# Patient Record
Sex: FEMALE | Race: WHITE | NOT HISPANIC OR LATINO
[De-identification: names, ages, dates, MRNs, and addresses within clinical notes are randomized per-mention and may not be internally consistent; named-entity substitution may affect disease eponyms.]

---

## 2019-11-18 ENCOUNTER — RESULT REVIEW (OUTPATIENT)
Age: 49
End: 2019-11-18

## 2022-10-24 ENCOUNTER — NON-APPOINTMENT (OUTPATIENT)
Age: 52
End: 2022-10-24

## 2022-11-03 ENCOUNTER — NON-APPOINTMENT (OUTPATIENT)
Age: 52
End: 2022-11-03

## 2022-11-03 ENCOUNTER — APPOINTMENT (OUTPATIENT)
Dept: CARDIOLOGY | Facility: CLINIC | Age: 52
End: 2022-11-03

## 2022-11-03 ENCOUNTER — APPOINTMENT (OUTPATIENT)
Dept: ORTHOPEDIC SURGERY | Facility: CLINIC | Age: 52
End: 2022-11-03

## 2022-11-03 VITALS
SYSTOLIC BLOOD PRESSURE: 165 MMHG | BODY MASS INDEX: 32.61 KG/M2 | DIASTOLIC BLOOD PRESSURE: 101 MMHG | HEIGHT: 64 IN | WEIGHT: 191 LBS | HEART RATE: 120 BPM | OXYGEN SATURATION: 98 %

## 2022-11-03 VITALS — HEIGHT: 64 IN | WEIGHT: 175 LBS | BODY MASS INDEX: 29.88 KG/M2

## 2022-11-03 DIAGNOSIS — Z78.9 OTHER SPECIFIED HEALTH STATUS: ICD-10-CM

## 2022-11-03 DIAGNOSIS — R00.2 PALPITATIONS: ICD-10-CM

## 2022-11-03 DIAGNOSIS — Z82.49 FAMILY HISTORY OF ISCHEMIC HEART DISEASE AND OTHER DISEASES OF THE CIRCULATORY SYSTEM: ICD-10-CM

## 2022-11-03 DIAGNOSIS — M77.11 LATERAL EPICONDYLITIS, RIGHT ELBOW: ICD-10-CM

## 2022-11-03 DIAGNOSIS — E66.3 OVERWEIGHT: ICD-10-CM

## 2022-11-03 PROCEDURE — 99244 OFF/OP CNSLTJ NEW/EST MOD 40: CPT

## 2022-11-03 PROCEDURE — 99214 OFFICE O/P EST MOD 30 MIN: CPT

## 2022-11-03 PROCEDURE — 73080 X-RAY EXAM OF ELBOW: CPT | Mod: RT

## 2022-11-03 PROCEDURE — 93000 ELECTROCARDIOGRAM COMPLETE: CPT

## 2022-11-03 RX ORDER — FOLIC ACID 1 MG/1
1 TABLET ORAL
Qty: 180 | Refills: 0 | Status: ACTIVE | COMMUNITY
Start: 2022-01-18

## 2022-11-03 RX ORDER — LEVOTHYROXINE, LIOTHYRONINE 38; 9 UG/1; UG/1
60 TABLET ORAL
Qty: 90 | Refills: 0 | Status: COMPLETED | COMMUNITY
Start: 2022-06-27

## 2022-11-03 RX ORDER — METRONIDAZOLE 7.5 MG/G
0.75 CREAM TOPICAL
Qty: 45 | Refills: 0 | Status: COMPLETED | COMMUNITY
Start: 2022-10-21

## 2022-11-03 RX ORDER — CICLOPIROX OLAMINE 7.7 MG/ML
0.77 SUSPENSION TOPICAL
Qty: 60 | Refills: 0 | Status: COMPLETED | COMMUNITY
Start: 2022-10-21

## 2022-11-03 RX ORDER — LEVOTHYROXINE, LIOTHYRONINE 57; 13.5 UG/1; UG/1
90 TABLET ORAL
Qty: 90 | Refills: 0 | Status: COMPLETED | COMMUNITY
Start: 2022-08-30

## 2022-11-03 RX ORDER — TRIAMCINOLONE ACETONIDE 1 MG/G
0.1 CREAM TOPICAL
Qty: 45 | Refills: 0 | Status: COMPLETED | COMMUNITY
Start: 2022-10-21

## 2022-11-03 RX ORDER — METHYLPREDNISOLONE 4 MG/1
4 TABLET ORAL
Qty: 21 | Refills: 0 | Status: COMPLETED | COMMUNITY
Start: 2022-08-29

## 2022-11-03 RX ORDER — AZELAIC ACID 0.15 G/G
15 GEL TOPICAL
Qty: 50 | Refills: 0 | Status: COMPLETED | COMMUNITY
Start: 2022-10-21

## 2022-11-03 NOTE — DISCUSSION/SUMMARY
[FreeTextEntry1] : Overall, I suspect that her initial symptoms of palpitations and hypertension were iatrogenic, on the basis of her excess use of thyroid hormone.  I suspect she was feeling better up until this morning because the thyroid hormone levels were lower, and she was returning to normal.  I suspect that her elevated heart rates and blood pressures today are likely on the basis of anxiety.\par \par I have suggested precautionary testing to include an echocardiogram and a Holter monitor.  She will continue to check her blood pressure at home periodically.  I expect that her evaluation will be unremarkable.  I will be in contact with her to discuss the results.

## 2022-11-03 NOTE — HISTORY OF PRESENT ILLNESS
[5] : 5 [0] : 0 [Dull/Aching] : dull/aching [Radiating] : radiating [Full time] : Work status: full time [] : Post Surgical Visit: no [FreeTextEntry1] : R elbow  [FreeTextEntry3] : 10/2022 [FreeTextEntry5] : 51 Y/O RHD F eval r Elbow Atraumatic pain for apporx 1 month no prior TX  [FreeTextEntry7] : Down the R Arm  [de-identified] :

## 2022-11-03 NOTE — HISTORY OF PRESENT ILLNESS
[FreeTextEntry1] : Evelyn presented to the office today for cardiovascular evaluation.  She presents for the further evaluation of palpitations.\par \par She is now 52 years old, with a history of being overweight.  She does not have hypertension, diabetes or hyperlipidemia.  She is not known to have any underlying structural heart disease.\par \par At baseline, she is not as physically active as she could be, but does use an exercise bicycle periodically.  With regular physical activities, she typically feels well, without reproducible chest discomfort or shortness of breath suggestive of angina.  She denies orthopnea, PND and edema.  She denies dizziness and syncope in the past.\par \par She recently visited her OB/GYN, and reported weight gain, and an inability to lose weight.  She was started on thyroid medication, having been told that her thyroid levels were "borderline ".  Shortly after starting it, she developed palpitations, further described as a sense that her heart was racing.  She stopped it after about a week, after experiencing the palpitations.  Unfortunately, the palpitations continued, and therefore she went to the emergency department of [facility].  She was found to be hypertensive, with tachycardic heart rates in the range of 130 bpm.  She was kept overnight.  She was told that her symptoms were because of her thyroid medication, and that she could follow-up with a cardiologist if she needed to.  She decided to make an appointment to see me because she is familiar with me through my care of her .\par \par She was feeling well over the past week, without any palpitations, until this morning.  She started to feel her heart racing.  She continues to feel it now.   Her blood pressure is typically very well controlled.

## 2022-11-03 NOTE — ASSESSMENT
[FreeTextEntry1] : -The patient's diagnosis and treatments options were reviewed in detail.\par -Expect this to be a chronic issue sometimes lasting over a year and necessitating treatment during that time.\par -Trial of home exercises is advised, a pamphlet was handed to the patient with further instructions.\par -Prescription NSAIDs including Voltaren gel also discussed. \par -Proper gripping and lifting reviewed in detail; avoid strenuous repetitive lifting.\par -Discussed need for injections if pain worsens.\par -Discussed surgical repair for refractory cases.\par -Will monitor progression closely.\par \par

## 2022-11-03 NOTE — IMAGING
[de-identified] : No swelling, no ecchymosis\par Tenderness to palpation over lateral epicondyle\par Full elbow flexion, extension, supination, pronation\par 5/5 strength in flexion, extension, supination, pronation\par Lateral Elbow pain with resisted wrist extension\par No Varus or Valgus instability\par Motor and sensory intact distally\par  [Right] : right elbow [There are no fractures, subluxations or dislocations. No significant abnormalities are seen] : There are no fractures, subluxations or dislocations. No significant abnormalities are seen

## 2022-11-07 ENCOUNTER — FORM ENCOUNTER (OUTPATIENT)
Age: 52
End: 2022-11-07

## 2022-11-08 ENCOUNTER — APPOINTMENT (OUTPATIENT)
Dept: MRI IMAGING | Facility: CLINIC | Age: 52
End: 2022-11-08

## 2022-11-08 ENCOUNTER — APPOINTMENT (OUTPATIENT)
Dept: ORTHOPEDIC SURGERY | Facility: CLINIC | Age: 52
End: 2022-11-08

## 2022-11-08 PROCEDURE — 73718 MRI LOWER EXTREMITY W/O DYE: CPT | Mod: LT

## 2022-11-08 PROCEDURE — L3260: CPT | Mod: LT

## 2022-11-08 PROCEDURE — 73630 X-RAY EXAM OF FOOT: CPT | Mod: LT

## 2022-11-08 PROCEDURE — 99213 OFFICE O/P EST LOW 20 MIN: CPT

## 2022-11-08 NOTE — HISTORY OF PRESENT ILLNESS
[Sudden] : sudden [7] : 7 [2] : 2 [Dull/Aching] : dull/aching [Localized] : localized [Throbbing] : throbbing [Household chores] : household chores [Leisure] : leisure [Social interactions] : social interactions [Rest] : rest [Sitting] : sitting [Walking] : walking [de-identified] : 11/8/22: Pt is a 52 year old F who presents today for evaluation of their left foot. Pt states that shes had dorsal foot pain since 11/1/22; Denies trauma/previous injury. No numbness/tingling. Ice to affected area. No formal treatment to date. WB in sneakers. Pain is consistent.  [] : Post Surgical Visit: no [FreeTextEntry1] : L foot

## 2022-11-08 NOTE — DISCUSSION/SUMMARY
[de-identified] : WBAT in post op shoe\par Icing/elevation\par MRI L foot eval for stress fracture of the 4th metatarsal\par f/u after MRI for review

## 2022-11-08 NOTE — PHYSICAL EXAM
[Left] : left foot and ankle [4th] : 4th [NL (20)] : dorsiflexion 20 degrees [NL (40)] : plantar flexion 40 degrees [5___] : plantar flexion 5[unfilled]/5 [2+] : dorsalis pedis pulse: 2+ [] : no achilles tendon insertion tenderness

## 2022-11-09 ENCOUNTER — TRANSCRIPTION ENCOUNTER (OUTPATIENT)
Age: 52
End: 2022-11-09

## 2022-11-09 ENCOUNTER — MED ADMIN CHARGE (OUTPATIENT)
Age: 52
End: 2022-11-09

## 2022-11-09 ENCOUNTER — APPOINTMENT (OUTPATIENT)
Dept: CARDIOLOGY | Facility: CLINIC | Age: 52
End: 2022-11-09

## 2022-11-09 PROCEDURE — 93224 XTRNL ECG REC UP TO 48 HRS: CPT

## 2022-11-09 PROCEDURE — 93306 TTE W/DOPPLER COMPLETE: CPT

## 2022-11-09 RX ORDER — PERFLUTREN 6.52 MG/ML
6.52 INJECTION, SUSPENSION INTRAVENOUS
Qty: 1 | Refills: 0 | Status: COMPLETED | OUTPATIENT
Start: 2022-11-09

## 2022-11-09 RX ADMIN — PERFLUTREN MG/ML: 6.52 INJECTION, SUSPENSION INTRAVENOUS at 00:00

## 2022-11-11 ENCOUNTER — APPOINTMENT (OUTPATIENT)
Dept: ORTHOPEDIC SURGERY | Facility: CLINIC | Age: 52
End: 2022-11-11

## 2022-11-11 PROCEDURE — 99214 OFFICE O/P EST MOD 30 MIN: CPT | Mod: 57

## 2022-11-11 PROCEDURE — 28470 CLTX METATARSAL FX WO MNP EA: CPT

## 2022-11-11 NOTE — DATA REVIEWED
[MRI] : MRI [Left] : left [FreeTextEntry1] : Impression:  1. Findings consistent with a nondisplaced fracture involving the base and proximal shaft of the third metatarsal  with moderate surrounding soft tissue swelling, periostitis, and mild adjacent extensor tenosynovitis and soft  tissue swelling dorsally. 2. Mild arthrosis at the plantar first MTP with subchondral cysts in the first metatarsal head. 3. Slight bursitis plantar to the second and third MTPs. 4. No Lisfranc ligament tear or malalignment in the midfoot.

## 2022-11-11 NOTE — HISTORY OF PRESENT ILLNESS
[Sudden] : sudden [7] : 7 [2] : 2 [Dull/Aching] : dull/aching [Localized] : localized [Throbbing] : throbbing [Household chores] : household chores [Leisure] : leisure [Social interactions] : social interactions [Rest] : rest [Sitting] : sitting [Walking] : walking [de-identified] : 11/8/22: Pt is a 52 year old F who presents today for evaluation of their left foot. Pt states that shes had dorsal foot pain since 11/1/22; Denies trauma/previous injury. No numbness/tingling. Ice to affected area. No formal treatment to date. WB in sneakers. Pain is consistent. \par \par 11/11/22 here to review the mri results on the left foot  P-op shoe [] : Post Surgical Visit: no [FreeTextEntry1] : L foot [de-identified] : mri done at [facility] [de-identified] : hard sole shoe

## 2022-11-17 ENCOUNTER — NON-APPOINTMENT (OUTPATIENT)
Age: 52
End: 2022-11-17

## 2022-12-01 ENCOUNTER — APPOINTMENT (OUTPATIENT)
Dept: ORTHOPEDIC SURGERY | Facility: CLINIC | Age: 52
End: 2022-12-01
Payer: COMMERCIAL

## 2022-12-01 DIAGNOSIS — M85.859 OTHER SPECIFIED DISORDERS OF BONE DENSITY AND STRUCTURE, UNSPECIFIED THIGH: ICD-10-CM

## 2022-12-01 DIAGNOSIS — E03.9 HYPOTHYROIDISM, UNSPECIFIED: ICD-10-CM

## 2022-12-01 DIAGNOSIS — Z78.9 OTHER SPECIFIED HEALTH STATUS: ICD-10-CM

## 2022-12-01 PROCEDURE — 73630 X-RAY EXAM OF FOOT: CPT | Mod: LT

## 2022-12-01 PROCEDURE — 99024 POSTOP FOLLOW-UP VISIT: CPT

## 2022-12-01 NOTE — PHYSICAL EXAM
[Left] : left foot and ankle [3rd] : 3rd [1+] : dorsalis pedis pulse: 1+ [] : patient ambulates without assistive device

## 2022-12-01 NOTE — HISTORY OF PRESENT ILLNESS
[Sudden] : sudden [2] : 2 [Dull/Aching] : dull/aching [Localized] : localized [Throbbing] : throbbing [Household chores] : household chores [Leisure] : leisure [Social interactions] : social interactions [Rest] : rest [Sitting] : sitting [Walking] : walking [3] : 3 [Full time] : Work status: full time [de-identified] : 11/8/22: Pt is a 52 year old F who presents today for evaluation of their left foot. Pt states that shes had dorsal foot pain since 11/1/22; Denies trauma/previous injury. No numbness/tingling. Ice to affected area. No formal treatment to date. WB in sneakers. Pain is consistent. \par \par 11/11/22 here to review the mri results on the left foot  P-op shoe\par 12/1/22  f/u L foot wb in p-op shoe [] : Post Surgical Visit: no [FreeTextEntry1] : L foot [de-identified] : mri done at [facility] [de-identified] : hard sole shoe

## 2022-12-01 NOTE — IMAGING
[Left] : left foot [The fracture is in acceptable alignment. There is progression in healing seen] : The fracture is in acceptable alignment. There is progression in healing seen

## 2022-12-08 ENCOUNTER — NON-APPOINTMENT (OUTPATIENT)
Age: 52
End: 2022-12-08

## 2023-01-03 ENCOUNTER — NON-APPOINTMENT (OUTPATIENT)
Age: 53
End: 2023-01-03

## 2023-01-19 ENCOUNTER — APPOINTMENT (OUTPATIENT)
Dept: ORTHOPEDIC SURGERY | Facility: CLINIC | Age: 53
End: 2023-01-19
Payer: COMMERCIAL

## 2023-01-19 VITALS — WEIGHT: 175 LBS | BODY MASS INDEX: 29.88 KG/M2 | HEIGHT: 64 IN

## 2023-01-19 DIAGNOSIS — M84.30XA STRESS FRACTURE, UNSPECIFIED SITE, INITIAL ENCOUNTER FOR FRACTURE: ICD-10-CM

## 2023-01-19 DIAGNOSIS — S92.335S: ICD-10-CM

## 2023-01-19 PROCEDURE — 99213 OFFICE O/P EST LOW 20 MIN: CPT | Mod: 24

## 2023-01-19 PROCEDURE — 73630 X-RAY EXAM OF FOOT: CPT | Mod: LT

## 2023-01-19 NOTE — HISTORY OF PRESENT ILLNESS
[Sudden] : sudden [5] : 5 [1] : 2 [Dull/Aching] : dull/aching [Localized] : localized [Throbbing] : throbbing [Household chores] : household chores [Leisure] : leisure [Social interactions] : social interactions [Rest] : rest [Sitting] : sitting [Walking] : walking [Full time] : Work status: full time [de-identified] : 11/8/22: Pt is a 52 year old F who presents today for evaluation of their left foot. Pt states that shes had dorsal foot pain since 11/1/22; Denies trauma/previous injury. No numbness/tingling. Ice to affected area. No formal treatment to date. WB in sneakers. Pain is consistent. \par \par 11/11/22 here to review the mri results on the left foot  P-op shoe\par 12/1/22  f/u L foot wb in p-op shoe\par 1/19/23  f/u L foot  Endocirne w/u possible Cushings [] : Post Surgical Visit: no [FreeTextEntry1] : L foot [de-identified] : mri done at [facility] [de-identified] : hard sole shoe

## 2023-02-02 ENCOUNTER — APPOINTMENT (OUTPATIENT)
Dept: MRI IMAGING | Facility: CLINIC | Age: 53
End: 2023-02-02
Payer: COMMERCIAL

## 2023-02-02 PROCEDURE — 70553 MRI BRAIN STEM W/O & W/DYE: CPT

## 2023-02-02 PROCEDURE — A9585: CPT

## 2023-02-04 ENCOUNTER — APPOINTMENT (OUTPATIENT)
Dept: MRI IMAGING | Facility: CLINIC | Age: 53
End: 2023-02-04
Payer: COMMERCIAL

## 2023-02-04 PROCEDURE — 74183 MRI ABD W/O CNTR FLWD CNTR: CPT

## 2023-02-04 PROCEDURE — A9585: CPT

## 2023-03-08 ENCOUNTER — NON-APPOINTMENT (OUTPATIENT)
Age: 53
End: 2023-03-08

## 2023-03-08 ENCOUNTER — APPOINTMENT (OUTPATIENT)
Dept: CARDIOLOGY | Facility: CLINIC | Age: 53
End: 2023-03-08
Payer: COMMERCIAL

## 2023-03-08 VITALS
HEART RATE: 81 BPM | HEIGHT: 64 IN | OXYGEN SATURATION: 98 % | DIASTOLIC BLOOD PRESSURE: 85 MMHG | WEIGHT: 202 LBS | SYSTOLIC BLOOD PRESSURE: 144 MMHG | BODY MASS INDEX: 34.49 KG/M2

## 2023-03-08 DIAGNOSIS — E24.0 PITUITARY-DEPENDENT CUSHING'S DISEASE: ICD-10-CM

## 2023-03-08 PROCEDURE — 93000 ELECTROCARDIOGRAM COMPLETE: CPT

## 2023-03-08 PROCEDURE — 99215 OFFICE O/P EST HI 40 MIN: CPT | Mod: 25

## 2023-03-08 RX ORDER — NEBIVOLOL 10 MG/1
10 TABLET ORAL
Refills: 0 | Status: ACTIVE | COMMUNITY

## 2023-03-08 NOTE — DISCUSSION/SUMMARY
[FreeTextEntry1] : Overall, I suspect that her initial symptoms of palpitations and hypertension were iatrogenic, on the basis of her excess use of thyroid hormone.  It now seems as if she has an active pituitary tumor which has resulted in weight gain, with associated increases in her blood pressure.  She is now going for an evaluation to see if she can be resected.\par \par From a cardiovascular perspective, I think she is stable.  Her blood pressure is controlled on her present regimen.  I explained that once her tumor is resected, she will hopefully lose weight, and her blood pressure issues may improve.  We will need to be prepared to dial down her blood pressure medication.\par \par She will keep me in the loop in terms of what the plans are, and start seeing me a bit more regularly following resection, if that is to take place.

## 2023-03-08 NOTE — HISTORY OF PRESENT ILLNESS
[FreeTextEntry1] : Evelyn presented to the office today for cardiovascular evaluation.  She was last seen in the office in November, for the evaluation of palpitations.\par \par She is now 52 years old, with a history of being overweight.  She did not have a prior diagnosis of hypertension until recently.  She does not have a history of diabetes or hyperlipidemia.  She is not known to have any underlying structural heart disease.\par \par At baseline, she is not as physically active as she could be, but does use an exercise bicycle periodically.  With regular physical activities, she typically feels well, without reproducible chest discomfort or shortness of breath suggestive of angina.  She denies orthopnea, PND and edema.  She denies dizziness and syncope in the past.\par \par I saw her in November, at which time she had recently visited her OB/GYN, and reported weight gain, and an inability to lose weight.  She was started on thyroid medication, having been told that her thyroid levels were "borderline ".  Shortly after starting it, she developed palpitations, further described as a sense that her heart was racing.  She stopped it after about a week, after experiencing the palpitations.  Unfortunately, the palpitations continued, and therefore she went to the emergency department of [facility].  She was found to be hypertensive, with tachycardic heart rates in the range of 130 bpm.  She was kept overnight.  She was told that her symptoms were because of her thyroid medication, and that she could follow-up with a cardiologist if she needed to.  She decided to make an appointment to see me because she is familiar with me through my care of her .\par \par When I saw her on November 3, she was feeling well, without any palpitations until the morning of her visit.  She started to feel her heart racing at that time.  I felt that initially, her symptoms were iatrogenic on the basis of a hyperthyroid state.  I thought her symptoms on the day of my evaluation with her were on the basis of anxiety.  I recommended a precautionary Holter monitor and echocardiogram.  Echocardiography was performed November 9, 2022.  This revealed a normal ejection fraction, with mild mitral regurgitation.  Holter monitoring revealed a sinus rhythm with an average heart rate of 92 bpm, and several brief runs of SVT, lasting up to 6.7 seconds.  I had her follow her blood pressures because of elevated blood pressures in the office.  \par \par She followed primarily with her endocrinologist, and had a series of medications started and stopped.  She was initially started on losartan/hydrochlorothiazide.  This was discontinued because of tachycardia.  This was eventually transitioned to olmesartan, and later, Bystolic was added.  Since then, her blood pressures have been well controlled.  Eventually, the endocrinologist diagnosed her with a pituitary microadenoma, and she is now going for evaluation for apparent Cushing's syndrome.  She is being evaluated at [facility] for the possibility of a resection.\par \par From a cardiovascular perspective, she is continued to feel reasonably well overall.  Her blood pressure is well controlled.  She gets momentary sensations of chest discomfort, inconsistent with structural heart disease.

## 2023-03-09 ENCOUNTER — APPOINTMENT (OUTPATIENT)
Dept: ORTHOPEDIC SURGERY | Facility: CLINIC | Age: 53
End: 2023-03-09

## 2023-08-21 ENCOUNTER — APPOINTMENT (OUTPATIENT)
Dept: ORTHOPEDIC SURGERY | Facility: CLINIC | Age: 53
End: 2023-08-21
Payer: COMMERCIAL

## 2023-08-21 VITALS — WEIGHT: 202 LBS | BODY MASS INDEX: 34.49 KG/M2 | HEIGHT: 64 IN

## 2023-08-21 DIAGNOSIS — Z86.39 PERSONAL HISTORY OF OTHER ENDOCRINE, NUTRITIONAL AND METABOLIC DISEASE: ICD-10-CM

## 2023-08-21 DIAGNOSIS — Z86.79 PERSONAL HISTORY OF OTHER DISEASES OF THE CIRCULATORY SYSTEM: ICD-10-CM

## 2023-08-21 PROCEDURE — 72110 X-RAY EXAM L-2 SPINE 4/>VWS: CPT

## 2023-08-21 PROCEDURE — 99214 OFFICE O/P EST MOD 30 MIN: CPT

## 2023-08-21 NOTE — IMAGING
[de-identified] : Spine: Inspection/Palpation: No tenderness to palpation throughout Cervical/thoracic/lumbar spine. No bony stepoffs, No lesions.  Gait: Non-antalgic,.     Neurologic:  Bilateral Lower Extremities 5/5 Iliopsoas/Quadriceps/Hamstrings/ Tibialis Anterior/ Gastrocnemius. Extensor Hallucis Longus/ Flexor Hallucis Longus except    Sensation intact to light touch L2-S1  Patellar/ Achilles reflex within normal limits.  X-ray Ap/Lateral/Flexion/Extension of lumbar spine were viewed and interpreted.  Normal alignment is maintained without any spondylolisthesis.

## 2023-08-21 NOTE — HISTORY OF PRESENT ILLNESS
[Lower back] : lower back [7] : 7 [2] : 2 [Dull/Aching] : dull/aching [de-identified] : 8/21/23: 51yo female presenting with lower back pain. No known injury/fall. has been experiencing dull/aching pain for about 6 months. Unable to sit/stand for long period of time without pain. Has taken Tylenol with some relief. Denies radiating pain and N/T.   Recently diagnosed with cushing's disease over last 6 months had pituitary surgery to fix her cushing's.  Currently on steroids to deal with her adrenal insufficiency from the surgery   Paion worse when sitting or standing for long periods of time. Better when lying down.  was diagnosed with osteopenia 2 years ago prior to being diagnosed with cushings.  has been doing PT ordered by PMD over last month without any relief.    [] : no

## 2023-08-21 NOTE — ASSESSMENT
[FreeTextEntry1] : 52 F with low back pain that clinically sounds like vertebral compression fracture. Given clinical history and high risk for injury due to osteoporosis and cushings recommend MRI to rule out VCF FU after MRI

## 2023-08-28 ENCOUNTER — APPOINTMENT (OUTPATIENT)
Dept: ORTHOPEDIC SURGERY | Facility: CLINIC | Age: 53
End: 2023-08-28
Payer: COMMERCIAL

## 2023-08-28 VITALS — BODY MASS INDEX: 34.49 KG/M2 | WEIGHT: 202 LBS | HEIGHT: 64 IN

## 2023-08-28 DIAGNOSIS — G89.29 LOW BACK PAIN, UNSPECIFIED: ICD-10-CM

## 2023-08-28 DIAGNOSIS — M54.50 LOW BACK PAIN, UNSPECIFIED: ICD-10-CM

## 2023-08-28 PROCEDURE — 99213 OFFICE O/P EST LOW 20 MIN: CPT

## 2023-08-28 NOTE — HISTORY OF PRESENT ILLNESS
[Lower back] : lower back [2] : 2 [Dull/Aching] : dull/aching [3] : 3 [de-identified] : 8/28/23: had MRI done at [facility]. Taking Tylenol with some improvement.   8/21/23: 53yo female presenting with lower back pain. No known injury/fall. has been experiencing dull/aching pain for about 6 months. Unable to sit/stand for long period of time without pain. Has taken Tylenol with some relief. Denies radiating pain and N/T.   Recently diagnosed with cushing's disease over last 6 months had pituitary surgery to fix her cushing's.  Currently on steroids to deal with her adrenal insufficiency from the surgery   Paion worse when sitting or standing for long periods of time. Better when lying down.  was diagnosed with osteopenia 2 years ago prior to being diagnosed with cushings.  has been doing PT ordered by PMD over last month without any relief.    [] : no

## 2023-08-28 NOTE — DATA REVIEWED
[MRI] : MRI [Lumbar Spine] : lumbar spine [I independently reviewed and interpreted images and report] : I independently reviewed and interpreted images and report [FreeTextEntry1] : multilevel degenerative changes with HNP and L4-5 and 5-s1

## 2023-08-28 NOTE — ASSESSMENT
[FreeTextEntry1] : 52 F with low back pain and HNP  We will also prescribe Muscle Relaxants as needed.  Discussed side effects including but not limited to drowsiness and dizziness.  Discussed patient should not drive after taking the medicine. PT FU 6 weeks

## 2023-08-28 NOTE — IMAGING
[de-identified] : Spine: Inspection/Palpation: No tenderness to palpation throughout Cervical/thoracic/lumbar spine. No bony stepoffs, No lesions.  Gait: Non-antalgic,.     Neurologic:  Bilateral Lower Extremities 5/5 Iliopsoas/Quadriceps/Hamstrings/ Tibialis Anterior/ Gastrocnemius. Extensor Hallucis Longus/ Flexor Hallucis Longus except    Sensation intact to light touch L2-S1  Patellar/ Achilles reflex within normal limits.  X-ray Ap/Lateral/Flexion/Extension of lumbar spine were viewed and interpreted.  Normal alignment is maintained without any spondylolisthesis.

## 2023-09-15 ENCOUNTER — APPOINTMENT (OUTPATIENT)
Dept: CARDIOLOGY | Facility: CLINIC | Age: 53
End: 2023-09-15
Payer: COMMERCIAL

## 2023-09-15 ENCOUNTER — NON-APPOINTMENT (OUTPATIENT)
Age: 53
End: 2023-09-15

## 2023-09-15 VITALS
OXYGEN SATURATION: 98 % | BODY MASS INDEX: 34.83 KG/M2 | HEIGHT: 64 IN | HEART RATE: 81 BPM | SYSTOLIC BLOOD PRESSURE: 131 MMHG | WEIGHT: 204 LBS | DIASTOLIC BLOOD PRESSURE: 77 MMHG

## 2023-09-15 PROCEDURE — 99214 OFFICE O/P EST MOD 30 MIN: CPT | Mod: 25

## 2023-09-15 PROCEDURE — 93000 ELECTROCARDIOGRAM COMPLETE: CPT

## 2023-09-26 ENCOUNTER — APPOINTMENT (OUTPATIENT)
Dept: ORTHOPEDIC SURGERY | Facility: CLINIC | Age: 53
End: 2023-09-26
Payer: COMMERCIAL

## 2023-09-26 VITALS — WEIGHT: 190 LBS | HEIGHT: 64 IN | BODY MASS INDEX: 32.44 KG/M2

## 2023-09-26 DIAGNOSIS — M77.12 LATERAL EPICONDYLITIS, LEFT ELBOW: ICD-10-CM

## 2023-09-26 PROCEDURE — 99213 OFFICE O/P EST LOW 20 MIN: CPT

## 2023-09-26 PROCEDURE — 99203 OFFICE O/P NEW LOW 30 MIN: CPT

## 2023-10-06 ENCOUNTER — TRANSCRIPTION ENCOUNTER (OUTPATIENT)
Age: 53
End: 2023-10-06

## 2023-10-17 ENCOUNTER — APPOINTMENT (OUTPATIENT)
Dept: ORTHOPEDIC SURGERY | Facility: CLINIC | Age: 53
End: 2023-10-17
Payer: COMMERCIAL

## 2023-10-17 VITALS — BODY MASS INDEX: 32.44 KG/M2 | WEIGHT: 190 LBS | HEIGHT: 64 IN

## 2023-10-17 DIAGNOSIS — M84.375A STRESS FRACTURE, LEFT FOOT, INITIAL ENCOUNTER FOR FRACTURE: ICD-10-CM

## 2023-10-17 PROCEDURE — 73630 X-RAY EXAM OF FOOT: CPT | Mod: LT

## 2023-10-17 PROCEDURE — L4361: CPT | Mod: LT

## 2023-10-17 PROCEDURE — 99213 OFFICE O/P EST LOW 20 MIN: CPT | Mod: 25

## 2023-10-19 ENCOUNTER — APPOINTMENT (OUTPATIENT)
Dept: ORTHOPEDIC SURGERY | Facility: CLINIC | Age: 53
End: 2023-10-19

## 2023-11-09 ENCOUNTER — APPOINTMENT (OUTPATIENT)
Dept: ORTHOPEDIC SURGERY | Facility: CLINIC | Age: 53
End: 2023-11-09
Payer: COMMERCIAL

## 2023-11-09 VITALS — BODY MASS INDEX: 32.44 KG/M2 | WEIGHT: 190 LBS | HEIGHT: 64 IN

## 2023-11-09 DIAGNOSIS — M84.375D STRESS FRACTURE, LEFT FOOT, SUBSEQUENT ENCOUNTER FOR FRACTURE WITH ROUTINE HEALING: ICD-10-CM

## 2023-11-09 PROCEDURE — 99212 OFFICE O/P EST SF 10 MIN: CPT | Mod: 25

## 2023-11-09 PROCEDURE — 73630 X-RAY EXAM OF FOOT: CPT | Mod: LT

## 2023-11-10 ENCOUNTER — APPOINTMENT (OUTPATIENT)
Dept: MRI IMAGING | Facility: CLINIC | Age: 53
End: 2023-11-10
Payer: COMMERCIAL

## 2023-11-10 PROCEDURE — 73718 MRI LOWER EXTREMITY W/O DYE: CPT | Mod: LT

## 2023-11-16 ENCOUNTER — APPOINTMENT (OUTPATIENT)
Dept: ORTHOPEDIC SURGERY | Facility: CLINIC | Age: 53
End: 2023-11-16
Payer: COMMERCIAL

## 2023-11-16 VITALS — HEIGHT: 64 IN | BODY MASS INDEX: 32.44 KG/M2 | WEIGHT: 190 LBS

## 2023-11-16 DIAGNOSIS — M77.52 OTHER ENTHESOPATHY OF LT FOOT AND ANKLE: ICD-10-CM

## 2023-11-16 PROCEDURE — 99212 OFFICE O/P EST SF 10 MIN: CPT

## 2023-11-20 RX ORDER — OLMESARTAN MEDOXOMIL 20 MG/1
20 TABLET, FILM COATED ORAL
Qty: 90 | Refills: 3 | Status: DISCONTINUED | COMMUNITY
Start: 2022-12-28 | End: 2023-11-20

## 2023-12-21 ENCOUNTER — NON-APPOINTMENT (OUTPATIENT)
Age: 53
End: 2023-12-21

## 2023-12-21 ENCOUNTER — APPOINTMENT (OUTPATIENT)
Dept: CARDIOLOGY | Facility: CLINIC | Age: 53
End: 2023-12-21
Payer: COMMERCIAL

## 2023-12-21 VITALS
WEIGHT: 203 LBS | DIASTOLIC BLOOD PRESSURE: 80 MMHG | HEIGHT: 64 IN | SYSTOLIC BLOOD PRESSURE: 124 MMHG | HEART RATE: 66 BPM | OXYGEN SATURATION: 97 % | BODY MASS INDEX: 34.66 KG/M2

## 2023-12-21 PROCEDURE — 99214 OFFICE O/P EST MOD 30 MIN: CPT | Mod: 25

## 2023-12-21 PROCEDURE — 93000 ELECTROCARDIOGRAM COMPLETE: CPT

## 2023-12-21 NOTE — DISCUSSION/SUMMARY
[FreeTextEntry1] : Overall, I suspect that her initial symptoms of palpitations and hypertension were iatrogenic, on the basis of her excess use of thyroid hormone.  She is now status post resection of a pituitary tumor.  I am hopeful that now that she has passed this issue, we will be able, over time, to reduce her medications for blood pressure further.  From a cardiovascular perspective, I think she is stable.  Her blood pressure is well-controlled in the mornings, but much higher in the evenings.  While it is not terrible, I think it is worth making an adjustment.  She will try migrating her dose of Bystolic to the afternoon, and possibly to the morning, depending on the results.  We discussed the possibility of splitting the dose, but we will see if that is necessary.  In terms of her vertiginous symptoms, we discussed the possibility of going to a neurologist, but she is not interested in seeing another specialist.  I have explained the basis of Epley maneuvers, and she will investigate on her own.  She will have blood work done this morning.  She is seeing a new internist over the next couple of months.  She will plan on calling me in about a month to let me know how her blood pressure modifications go, and see me in the office in 3 months.     [EKG obtained to assist in diagnosis and management of assessed problem(s)] : EKG obtained to assist in diagnosis and management of assessed problem(s)

## 2023-12-21 NOTE — HISTORY OF PRESENT ILLNESS
[FreeTextEntry1] : Evelyn presented to the office today for cardiovascular evaluation.  She was last seen in the office 6 months ago.  She is now 53 years old, with a history of being overweight.  She did not have a prior diagnosis of hypertension until recently.  She does not have a history of diabetes or hyperlipidemia.  She is not known to have any underlying structural heart disease.  At baseline, she is not as physically active as she could be, but does use an exercise bicycle periodically.  With regular physical activities, she typically feels well, without reproducible chest discomfort or shortness of breath suggestive of angina.  She denies orthopnea, PND and edema.  She denies dizziness and syncope in the past.  I saw her in November, 2022 at which time she had recently visited her OB/GYN, and reported weight gain, and an inability to lose weight.  She was started on thyroid medication, having been told that her thyroid levels were "borderline ".  Shortly after starting it, she developed palpitations, further described as a sense that her heart was racing.  She stopped it after about a week, after experiencing the palpitations.  Unfortunately, the palpitations continued, and therefore she went to the emergency department of [facility].  She was found to be hypertensive, with tachycardic heart rates in the range of 130 bpm.  She was kept overnight.  She was told that her symptoms were because of her thyroid medication, and that she could follow-up with a cardiologist if she needed to.  She decided to make an appointment to see me because she is familiar with me through my care of her .  When I saw her on November 3, she was feeling well, without any palpitations until the morning of her visit.  She started to feel her heart racing at that time.  I felt that initially, her symptoms were iatrogenic on the basis of a hyperthyroid state.  I thought her symptoms on the day of my evaluation with her were on the basis of anxiety.  I recommended a precautionary Holter monitor and echocardiogram.  Echocardiography was performed November 9, 2022.  This revealed a normal ejection fraction, with mild mitral regurgitation.  Holter monitoring revealed a sinus rhythm with an average heart rate of 92 bpm, and several brief runs of SVT, lasting up to 6.7 seconds.  I had her follow her blood pressures because of elevated blood pressures in the office.    She followed primarily with her endocrinologist, and had a series of medications started and stopped.  She was initially started on losartan/hydrochlorothiazide.  This was discontinued because of tachycardia.  This was eventually transitioned to olmesartan, and later, Bystolic was added.  Since then, her blood pressures have been well controlled.  Eventually, the endocrinologist diagnosed her with a pituitary microadenoma, and she is now status post resection of this in June 2023, at [facility].  When she was evaluated in September 2023 she was doing well, and recovering from her surgery.  Her blood pressure had been somewhat low, and olmesartan was reduced.  In November 2023, her blood pressure was again low, into the 90s.  Olmesartan was discontinued.  She phoned the office December 4, 2023 with symptoms suggestive of vertigo.  Her blood pressure was borderline at times, but medication was not changed.  More recently, she has been checking her blood pressure, and although her morning blood pressures are quite good, her evening blood pressures were more frequently elevated.  Given the lability, no changes were made.  From a cardiovascular perspective, she has continued to feel reasonably well overall. She has been trying to exercise,  but broke her foot for the second time and is just returning to walking.  She does not report symptoms of angina, or congestive heart failure.  She denies palpitations.   Her blood pressure has been high in the afternoons, and she takes Bystolic in the evenings.. She is slowly weaning hydrocortisone, down to 10mg in the mornings.  She has had some vertiginous symptoms when she lies down.

## 2023-12-21 NOTE — CARDIOLOGY SUMMARY
[de-identified] : 11/3/22 sinus tachycardia March 8, 2023 sinus rhythm September 15, 2023 normal sinus rhythm December 21, 2023 normal sinus rhythm

## 2023-12-22 LAB
ALBUMIN SERPL ELPH-MCNC: 4.3 G/DL
ALP BLD-CCNC: 64 U/L
ALT SERPL-CCNC: 23 U/L
ANION GAP SERPL CALC-SCNC: 11 MMOL/L
AST SERPL-CCNC: 21 U/L
BILIRUB SERPL-MCNC: 0.3 MG/DL
BUN SERPL-MCNC: 14 MG/DL
CALCIUM SERPL-MCNC: 9.6 MG/DL
CHLORIDE SERPL-SCNC: 105 MMOL/L
CHOLEST SERPL-MCNC: 174 MG/DL
CO2 SERPL-SCNC: 27 MMOL/L
CREAT SERPL-MCNC: 0.88 MG/DL
EGFR: 78 ML/MIN/1.73M2
ESTIMATED AVERAGE GLUCOSE: 126 MG/DL
GLUCOSE SERPL-MCNC: 109 MG/DL
HBA1C MFR BLD HPLC: 6 %
HDLC SERPL-MCNC: 55 MG/DL
LDLC SERPL CALC-MCNC: 98 MG/DL
NONHDLC SERPL-MCNC: 120 MG/DL
POTASSIUM SERPL-SCNC: 4.6 MMOL/L
PROT SERPL-MCNC: 6.5 G/DL
SODIUM SERPL-SCNC: 142 MMOL/L
TRIGL SERPL-MCNC: 122 MG/DL
TSH SERPL-ACNC: 2.87 UIU/ML

## 2024-01-22 ENCOUNTER — NON-APPOINTMENT (OUTPATIENT)
Age: 54
End: 2024-01-22

## 2024-01-23 ENCOUNTER — NON-APPOINTMENT (OUTPATIENT)
Age: 54
End: 2024-01-23

## 2024-01-23 ENCOUNTER — APPOINTMENT (OUTPATIENT)
Dept: INTERNAL MEDICINE | Facility: CLINIC | Age: 54
End: 2024-01-23
Payer: COMMERCIAL

## 2024-01-23 VITALS
RESPIRATION RATE: 14 BRPM | WEIGHT: 197 LBS | DIASTOLIC BLOOD PRESSURE: 84 MMHG | SYSTOLIC BLOOD PRESSURE: 130 MMHG | HEIGHT: 64 IN | TEMPERATURE: 98.8 F | HEART RATE: 80 BPM | BODY MASS INDEX: 33.63 KG/M2 | OXYGEN SATURATION: 98 %

## 2024-01-23 DIAGNOSIS — D35.2 BENIGN NEOPLASM OF PITUITARY GLAND: ICD-10-CM

## 2024-01-23 PROCEDURE — 99386 PREV VISIT NEW AGE 40-64: CPT | Mod: 25

## 2024-01-23 RX ORDER — GABAPENTIN 300 MG/1
300 CAPSULE ORAL
Refills: 0 | Status: ACTIVE | COMMUNITY

## 2024-01-23 RX ORDER — ESTRADIOL 0.5 MG/.5G
0.5 GEL TOPICAL
Qty: 90 | Refills: 0 | Status: DISCONTINUED | COMMUNITY
Start: 2022-10-10 | End: 2024-01-23

## 2024-01-23 RX ORDER — SODIUM SULFATE, MAGNESIUM SULFATE, AND POTASSIUM CHLORIDE 17.75; 2.7; 2.25 G/1; G/1; G/1
1479-225-188 TABLET ORAL
Qty: 24 | Refills: 0 | Status: DISCONTINUED | COMMUNITY
Start: 2022-06-29 | End: 2024-01-23

## 2024-01-23 RX ORDER — PROGESTERONE 200 MG/1
200 CAPSULE ORAL
Qty: 90 | Refills: 0 | Status: DISCONTINUED | COMMUNITY
Start: 2022-08-30 | End: 2024-01-23

## 2024-01-23 NOTE — HEALTH RISK ASSESSMENT
[Very Good] : ~his/her~ current health as very good [Good] : ~his/her~  mood as  good [No falls in past year] : Patient reported no falls in the past year [1] : 1) Little interest or pleasure doing things for several days (1) [0] : 2) Feeling down, depressed, or hopeless: Not at all (0) [Patient reported mammogram was normal] : Patient reported mammogram was normal [[id number]] : 1 [MammogramDate] : 03/23 [PapSmearDate] : 2023 [ColonoscopyDate] : 2021 [ColonoscopyComments] : poor prep- needs repeat colon

## 2024-01-23 NOTE — HISTORY OF PRESENT ILLNESS
[de-identified] : here today for cpe recently diag  with pituitary adenoma- and followed at [facility].

## 2024-01-25 DIAGNOSIS — Z00.00 ENCOUNTER FOR GENERAL ADULT MEDICAL EXAMINATION W/OUT ABNORMAL FINDINGS: ICD-10-CM

## 2024-01-25 LAB
25(OH)D3 SERPL-MCNC: 56.8 NG/ML
ALBUMIN SERPL ELPH-MCNC: 4.3 G/DL
ALP BLD-CCNC: 68 U/L
ALT SERPL-CCNC: 30 U/L
ANION GAP SERPL CALC-SCNC: 12 MMOL/L
APPEARANCE: ABNORMAL
AST SERPL-CCNC: 28 U/L
BACTERIA: ABNORMAL /HPF
BILIRUB SERPL-MCNC: 0.3 MG/DL
BILIRUBIN URINE: NEGATIVE
BLOOD URINE: ABNORMAL
BUN SERPL-MCNC: 14 MG/DL
CALCIUM SERPL-MCNC: 9.6 MG/DL
CAST: 0 /LPF
CHLORIDE SERPL-SCNC: 104 MMOL/L
CHOLEST SERPL-MCNC: 188 MG/DL
CO2 SERPL-SCNC: 25 MMOL/L
COLOR: YELLOW
CORTIS SERPL-MCNC: 10.8 UG/DL
CREAT SERPL-MCNC: 0.82 MG/DL
EGFR: 85 ML/MIN/1.73M2
EPITHELIAL CELLS: 24 /HPF
ESTIMATED AVERAGE GLUCOSE: 123 MG/DL
FOLATE SERPL-MCNC: >20 NG/ML
GH SERPL-MCNC: 0.97 NG/ML
GLUCOSE QUALITATIVE U: NEGATIVE MG/DL
GLUCOSE SERPL-MCNC: 101 MG/DL
HBA1C MFR BLD HPLC: 5.9 %
HDLC SERPL-MCNC: 56 MG/DL
HEMOCCULT STL QL IA: NEGATIVE
KETONES URINE: NEGATIVE MG/DL
LDLC SERPL CALC-MCNC: 109 MG/DL
LEUKOCYTE ESTERASE URINE: ABNORMAL
MICROSCOPIC-UA: NORMAL
NITRITE URINE: NEGATIVE
NONHDLC SERPL-MCNC: 132 MG/DL
PH URINE: 5.5
POTASSIUM SERPL-SCNC: 4.1 MMOL/L
PROT SERPL-MCNC: 6.3 G/DL
PROTEIN URINE: NEGATIVE MG/DL
RED BLOOD CELLS URINE: 2 /HPF
REVIEW: NORMAL
SODIUM SERPL-SCNC: 141 MMOL/L
SPECIFIC GRAVITY URINE: 1.02
TRIGL SERPL-MCNC: 127 MG/DL
TSH SERPL-ACNC: 3.66 UIU/ML
UROBILINOGEN URINE: 0.2 MG/DL
VIT B12 SERPL-MCNC: 823 PG/ML
WHITE BLOOD CELLS URINE: 109 /HPF

## 2024-01-26 LAB — IGF BP1 SERPL-MCNC: 135 NG/ML

## 2024-01-30 LAB
APPEARANCE: CLEAR
BACTERIA UR CULT: NORMAL
BACTERIA: NEGATIVE /HPF
BILIRUBIN URINE: NEGATIVE
BLOOD URINE: NEGATIVE
CAST: 0 /LPF
COLOR: YELLOW
EPITHELIAL CELLS: 2 /HPF
GLUCOSE QUALITATIVE U: NEGATIVE MG/DL
KETONES URINE: NEGATIVE MG/DL
LEUKOCYTE ESTERASE URINE: ABNORMAL
MICROSCOPIC-UA: NORMAL
NITRITE URINE: NEGATIVE
PH URINE: 7
PROTEIN URINE: NEGATIVE MG/DL
RED BLOOD CELLS URINE: 0 /HPF
SPECIFIC GRAVITY URINE: 1.01
UROBILINOGEN URINE: 0.2 MG/DL
WHITE BLOOD CELLS URINE: 6 /HPF

## 2024-02-21 RX ORDER — NEBIVOLOL 10 MG/1
10 TABLET ORAL DAILY
Qty: 90 | Refills: 2 | Status: ACTIVE | COMMUNITY
Start: 2022-12-28

## 2024-03-20 ENCOUNTER — APPOINTMENT (OUTPATIENT)
Dept: CARDIOLOGY | Facility: CLINIC | Age: 54
End: 2024-03-20
Payer: COMMERCIAL

## 2024-03-20 ENCOUNTER — NON-APPOINTMENT (OUTPATIENT)
Age: 54
End: 2024-03-20

## 2024-03-20 VITALS
HEART RATE: 76 BPM | SYSTOLIC BLOOD PRESSURE: 113 MMHG | OXYGEN SATURATION: 98 % | BODY MASS INDEX: 32.95 KG/M2 | WEIGHT: 193 LBS | HEIGHT: 64 IN | DIASTOLIC BLOOD PRESSURE: 74 MMHG

## 2024-03-20 PROCEDURE — G2211 COMPLEX E/M VISIT ADD ON: CPT

## 2024-03-20 PROCEDURE — 93000 ELECTROCARDIOGRAM COMPLETE: CPT

## 2024-03-20 PROCEDURE — 99214 OFFICE O/P EST MOD 30 MIN: CPT

## 2024-03-20 NOTE — DISCUSSION/SUMMARY
[FreeTextEntry1] : Overall, I suspect that her initial symptoms of palpitations and hypertension were iatrogenic, on the basis of her excess use of thyroid hormone.  She is now status post resection of a pituitary tumor.  I am hopeful that now that she has passed this issue, we will be able, over time, to reduce her medications for blood pressure further.  From a cardiovascular perspective, I think she is stable.  Her blood pressure is better controlled overall.  She is losing weight, and her steroid doses are falling.  It may be possible to ultimately have her come off blood pressure medication.  For now, I think I will keep it going.  She is likely to lose more weight and have a down titration or 2 over the next few months in her dose of steroid, and I would like to see her back in the office in 3 months to see whether we can further reduce her medication for blood pressure.  We discussed her palpitations which sound like they are likely to reflect either an enhanced awareness of premature beats which have been known to be PACs based on a prior Holter, or perhaps an increased frequency of premature beats related to a supine position.  Either way, I do not think additional evaluation is necessary.  She will keep an eye on this symptom.    [EKG obtained to assist in diagnosis and management of assessed problem(s)] : EKG obtained to assist in diagnosis and management of assessed problem(s)

## 2024-03-20 NOTE — PHYSICAL EXAM
[Well Developed] : well developed [Well Nourished] : well nourished [No Acute Distress] : no acute distress [Normal Conjunctiva] : normal conjunctiva [Normal Venous Pressure] : normal venous pressure [No Carotid Bruit] : no carotid bruit [Normal S1, S2] : normal S1, S2 [No Murmur] : no murmur [No Rub] : no rub [No Gallop] : no gallop [Clear Lung Fields] : clear lung fields [Good Air Entry] : good air entry [No Respiratory Distress] : no respiratory distress  [Soft] : abdomen soft [Non Tender] : non-tender [No Masses/organomegaly] : no masses/organomegaly [Normal Gait] : normal gait [Normal Bowel Sounds] : normal bowel sounds [No Edema] : no edema [No Cyanosis] : no cyanosis [No Varicosities] : no varicosities [No Clubbing] : no clubbing [No Skin Lesions] : no skin lesions [No Rash] : no rash [No Focal Deficits] : no focal deficits [Moves all extremities] : moves all extremities [Alert and Oriented] : alert and oriented [Normal Speech] : normal speech [Normal memory] : normal memory

## 2024-03-20 NOTE — CARDIOLOGY SUMMARY
[de-identified] : 11/3/22 sinus tachycardia March 8, 2023 sinus rhythm September 15, 2023 normal sinus rhythm December 21, 2023 normal sinus rhythm March 20, 2024 normal sinus rhythm

## 2024-03-20 NOTE — HISTORY OF PRESENT ILLNESS
[FreeTextEntry1] : Evelyn presented to the office today for cardiovascular evaluation.  She was last seen in the office 3 months ago.  She is now 53 years old, with a history of being overweight.  She did not have a prior diagnosis of hypertension until recently.  She does not have a history of diabetes or hyperlipidemia.  She is not known to have any underlying structural heart disease.  At baseline, she is not as physically active as she could be, but does use an exercise bicycle periodically.  With regular physical activities, she typically feels well, without reproducible chest discomfort or shortness of breath suggestive of angina.  She denies orthopnea, PND and edema.  She denies dizziness and syncope in the past.  I saw her in November, 2022 at which time she had recently visited her OB/GYN, and reported weight gain, and an inability to lose weight.  She was started on thyroid medication, having been told that her thyroid levels were "borderline ".  Shortly after starting it, she developed palpitations, further described as a sense that her heart was racing.  She stopped it after about a week, after experiencing the palpitations.  Unfortunately, the palpitations continued, and therefore she went to the emergency department of [facility].  She was found to be hypertensive, with tachycardic heart rates in the range of 130 bpm.  She was kept overnight.  She was told that her symptoms were because of her thyroid medication, and that she could follow-up with a cardiologist if she needed to.  She decided to make an appointment to see me because she is familiar with me through my care of her .  When I saw her on November 3, she was feeling well, without any palpitations until the morning of her visit.  She started to feel her heart racing at that time.  I felt that initially, her symptoms were iatrogenic on the basis of a hyperthyroid state.  I thought her symptoms on the day of my evaluation with her were on the basis of anxiety.  I recommended a precautionary Holter monitor and echocardiogram.  Echocardiography was performed November 9, 2022.  This revealed a normal ejection fraction, with mild mitral regurgitation.  Holter monitoring revealed a sinus rhythm with an average heart rate of 92 bpm, and several brief runs of SVT, lasting up to 6.7 seconds.  I had her follow her blood pressures because of elevated blood pressures in the office.    She followed primarily with her endocrinologist, and had a series of medications started and stopped.  She was initially started on losartan/hydrochlorothiazide.  This was discontinued because of tachycardia.  This was eventually transitioned to olmesartan, and later, Bystolic was added.  Since then, her blood pressures have been well controlled.  Eventually, the endocrinologist diagnosed her with a pituitary microadenoma, and she is now status post resection of this in June 2023, at [facility].  When she was evaluated in September 2023 she was doing well, and recovering from her surgery.  Her blood pressure had been somewhat low, and olmesartan was reduced.  In November 2023, her blood pressure was again low, into the 90s.  Olmesartan was discontinued.  She phoned the office December 4, 2023 with symptoms suggestive of vertigo.  Her blood pressure was borderline at times, but medication was not changed.  More recently, she has been checking her blood pressure, and although her morning blood pressures are quite good, her evening blood pressures were more frequently elevated.  Given the lability, no changes were made.  I saw her in December 2023 and her blood pressure remained labile we discussed migrating the dose of her medication to see if that could handle controlling the blood pressures better, more reproducibly.  From a cardiovascular perspective, she has continued to feel reasonably well overall.  Although she did have some orthopedic issues related to a broken foot, she has now been exercising every other day, primarily with squats and planks, and less with aerobic activity.  She does not report symptoms of angina, or congestive heart failure.  She notices intermittent palpitations, described as a "tickle ", when she lies down at night.  She does not have any sustained symptoms of arrhythmia..   Her blood pressure has been much better overall, and she takes Bystolic in the afternoon, about 2pm. She is slowly weaning hydrocortisone, down to 7.5 mg in the mornings.

## 2024-06-27 ENCOUNTER — APPOINTMENT (OUTPATIENT)
Dept: CARDIOLOGY | Facility: CLINIC | Age: 54
End: 2024-06-27
Payer: COMMERCIAL

## 2024-06-27 ENCOUNTER — NON-APPOINTMENT (OUTPATIENT)
Age: 54
End: 2024-06-27

## 2024-06-27 VITALS
OXYGEN SATURATION: 99 % | BODY MASS INDEX: 32.1 KG/M2 | DIASTOLIC BLOOD PRESSURE: 75 MMHG | WEIGHT: 188 LBS | HEART RATE: 74 BPM | SYSTOLIC BLOOD PRESSURE: 124 MMHG | HEIGHT: 64 IN

## 2024-06-27 DIAGNOSIS — I10 ESSENTIAL (PRIMARY) HYPERTENSION: ICD-10-CM

## 2024-06-27 PROCEDURE — 99214 OFFICE O/P EST MOD 30 MIN: CPT

## 2024-06-27 PROCEDURE — 93000 ELECTROCARDIOGRAM COMPLETE: CPT

## 2024-06-27 PROCEDURE — G2211 COMPLEX E/M VISIT ADD ON: CPT

## 2024-07-22 ENCOUNTER — APPOINTMENT (OUTPATIENT)
Dept: ORTHOPEDIC SURGERY | Facility: CLINIC | Age: 54
End: 2024-07-22

## 2024-07-22 VITALS — HEIGHT: 64 IN | BODY MASS INDEX: 32.1 KG/M2 | WEIGHT: 188 LBS

## 2024-07-22 DIAGNOSIS — M54.50 LOW BACK PAIN, UNSPECIFIED: ICD-10-CM

## 2024-07-22 DIAGNOSIS — M54.2 CERVICALGIA: ICD-10-CM

## 2024-07-22 DIAGNOSIS — G89.29 LOW BACK PAIN, UNSPECIFIED: ICD-10-CM

## 2024-07-22 PROCEDURE — 72050 X-RAY EXAM NECK SPINE 4/5VWS: CPT

## 2024-07-22 PROCEDURE — 99213 OFFICE O/P EST LOW 20 MIN: CPT | Mod: 25

## 2024-07-22 NOTE — HISTORY OF PRESENT ILLNESS
[Neck] : neck [de-identified] : 07/22/2024: 53-year-old female presenting with neck pain that developed a while ago. States that she has Cushing's disease and is currently going through a cortisol withdrawal causing her to have aches and pains. No known injury/fall.    Hx of Cushing's dz- pituitary tumor removed 1 year ago 6/28/23- had cortisone 40mg taper over 10 months  developed joint pain All: [employer] Occupation: -Part time

## 2024-07-22 NOTE — IMAGING
[Straightening consistent with spasm] : Straightening consistent with spasm [Facet arthropathy] : Facet arthropathy [Disc space narrowing] : Disc space narrowing [No instability seen on flexion/extension] : No instability seen on flexion/extension

## 2024-07-22 NOTE — ASSESSMENT
[FreeTextEntry1] : 53  year old female presents today with complaints of neck pain PT medical massage fu in 6 weeks

## 2024-09-09 ENCOUNTER — APPOINTMENT (OUTPATIENT)
Dept: ORTHOPEDIC SURGERY | Facility: CLINIC | Age: 54
End: 2024-09-09

## 2024-11-26 ENCOUNTER — APPOINTMENT (OUTPATIENT)
Dept: ORTHOPEDIC SURGERY | Facility: CLINIC | Age: 54
End: 2024-11-26
Payer: COMMERCIAL

## 2024-11-26 VITALS — HEIGHT: 64 IN | BODY MASS INDEX: 30.39 KG/M2 | WEIGHT: 178 LBS

## 2024-11-26 DIAGNOSIS — G89.29 LOW BACK PAIN, UNSPECIFIED: ICD-10-CM

## 2024-11-26 DIAGNOSIS — M54.50 LOW BACK PAIN, UNSPECIFIED: ICD-10-CM

## 2024-11-26 PROCEDURE — 99213 OFFICE O/P EST LOW 20 MIN: CPT | Mod: 25

## 2024-11-26 PROCEDURE — 72100 X-RAY EXAM L-S SPINE 2/3 VWS: CPT

## 2024-11-26 RX ORDER — MELOXICAM 15 MG/1
15 TABLET ORAL DAILY
Qty: 28 | Refills: 1 | Status: ACTIVE | COMMUNITY
Start: 2024-11-26 | End: 1900-01-01

## 2024-11-26 RX ORDER — CYCLOBENZAPRINE HYDROCHLORIDE 5 MG/1
5 TABLET, FILM COATED ORAL 3 TIMES DAILY
Qty: 30 | Refills: 0 | Status: ACTIVE | COMMUNITY
Start: 2024-11-26 | End: 1900-01-01

## 2025-01-06 ENCOUNTER — APPOINTMENT (OUTPATIENT)
Dept: CARDIOLOGY | Facility: CLINIC | Age: 55
End: 2025-01-06
Payer: COMMERCIAL

## 2025-01-06 ENCOUNTER — NON-APPOINTMENT (OUTPATIENT)
Age: 55
End: 2025-01-06

## 2025-01-06 VITALS
HEIGHT: 64 IN | DIASTOLIC BLOOD PRESSURE: 72 MMHG | BODY MASS INDEX: 30.9 KG/M2 | WEIGHT: 181 LBS | SYSTOLIC BLOOD PRESSURE: 125 MMHG | OXYGEN SATURATION: 99 % | HEART RATE: 69 BPM

## 2025-01-06 DIAGNOSIS — I10 ESSENTIAL (PRIMARY) HYPERTENSION: ICD-10-CM

## 2025-01-06 PROCEDURE — 93000 ELECTROCARDIOGRAM COMPLETE: CPT

## 2025-01-06 PROCEDURE — 99214 OFFICE O/P EST MOD 30 MIN: CPT

## 2025-01-06 PROCEDURE — G2211 COMPLEX E/M VISIT ADD ON: CPT | Mod: NC

## 2025-01-08 ENCOUNTER — APPOINTMENT (OUTPATIENT)
Dept: ORTHOPEDIC SURGERY | Facility: CLINIC | Age: 55
End: 2025-01-08

## 2025-02-25 ENCOUNTER — TRANSCRIPTION ENCOUNTER (OUTPATIENT)
Age: 55
End: 2025-02-25

## 2025-02-26 ENCOUNTER — NON-APPOINTMENT (OUTPATIENT)
Age: 55
End: 2025-02-26

## 2025-03-17 ENCOUNTER — APPOINTMENT (OUTPATIENT)
Dept: ALLERGY | Facility: CLINIC | Age: 55
End: 2025-03-17

## 2025-04-04 ENCOUNTER — LABORATORY RESULT (OUTPATIENT)
Age: 55
End: 2025-04-04

## 2025-04-04 ENCOUNTER — APPOINTMENT (OUTPATIENT)
Dept: INTERNAL MEDICINE | Facility: CLINIC | Age: 55
End: 2025-04-04
Payer: COMMERCIAL

## 2025-04-04 VITALS
RESPIRATION RATE: 14 BRPM | DIASTOLIC BLOOD PRESSURE: 60 MMHG | HEART RATE: 69 BPM | HEIGHT: 64 IN | BODY MASS INDEX: 30.22 KG/M2 | TEMPERATURE: 98.3 F | SYSTOLIC BLOOD PRESSURE: 118 MMHG | OXYGEN SATURATION: 98 % | WEIGHT: 177 LBS

## 2025-04-04 DIAGNOSIS — I10 ESSENTIAL (PRIMARY) HYPERTENSION: ICD-10-CM

## 2025-04-04 DIAGNOSIS — Z00.00 ENCOUNTER FOR GENERAL ADULT MEDICAL EXAMINATION W/OUT ABNORMAL FINDINGS: ICD-10-CM

## 2025-04-04 DIAGNOSIS — E24.0 PITUITARY-DEPENDENT CUSHING'S DISEASE: ICD-10-CM

## 2025-04-04 DIAGNOSIS — E03.9 HYPOTHYROIDISM, UNSPECIFIED: ICD-10-CM

## 2025-04-04 PROCEDURE — 99396 PREV VISIT EST AGE 40-64: CPT

## 2025-04-07 ENCOUNTER — APPOINTMENT (OUTPATIENT)
Dept: ALLERGY | Facility: CLINIC | Age: 55
End: 2025-04-07
Payer: COMMERCIAL

## 2025-04-07 ENCOUNTER — NON-APPOINTMENT (OUTPATIENT)
Age: 55
End: 2025-04-07

## 2025-04-07 VITALS
OXYGEN SATURATION: 98 % | SYSTOLIC BLOOD PRESSURE: 132 MMHG | HEART RATE: 75 BPM | DIASTOLIC BLOOD PRESSURE: 78 MMHG | TEMPERATURE: 97.7 F

## 2025-04-07 DIAGNOSIS — K21.9 GASTRO-ESOPHAGEAL REFLUX DISEASE W/OUT ESOPHAGITIS: ICD-10-CM

## 2025-04-07 LAB
25(OH)D3 SERPL-MCNC: 56.2 NG/ML
ALBUMIN SERPL ELPH-MCNC: 4.6 G/DL
ALP BLD-CCNC: 74 U/L
ALT SERPL-CCNC: 19 U/L
ANION GAP SERPL CALC-SCNC: 14 MMOL/L
APPEARANCE: CLEAR
AST SERPL-CCNC: 20 U/L
BACTERIA: NEGATIVE /HPF
BASOPHILS # BLD AUTO: 0.07 K/UL
BASOPHILS NFR BLD AUTO: 1 %
BILIRUB SERPL-MCNC: 0.3 MG/DL
BILIRUBIN URINE: NEGATIVE
BLOOD URINE: NEGATIVE
BUN SERPL-MCNC: 15 MG/DL
CALCIUM SERPL-MCNC: 9.4 MG/DL
CAST: 0 /LPF
CHLORIDE SERPL-SCNC: 104 MMOL/L
CHOLEST SERPL-MCNC: 212 MG/DL
CO2 SERPL-SCNC: 20 MMOL/L
COLOR: YELLOW
CREAT SERPL-MCNC: 0.74 MG/DL
EGFRCR SERPLBLD CKD-EPI 2021: 95 ML/MIN/1.73M2
EOSINOPHIL # BLD AUTO: 0.1 K/UL
EOSINOPHIL NFR BLD AUTO: 1.5 %
EPITHELIAL CELLS: 0 /HPF
ESTIMATED AVERAGE GLUCOSE: 117 MG/DL
FOLATE SERPL-MCNC: >20 NG/ML
GLUCOSE QUALITATIVE U: NEGATIVE MG/DL
GLUCOSE SERPL-MCNC: 106 MG/DL
HBA1C MFR BLD HPLC: 5.7 %
HCT VFR BLD CALC: 37.7 %
HDLC SERPL-MCNC: 56 MG/DL
HGB BLD-MCNC: 13.3 G/DL
IMM GRANULOCYTES NFR BLD AUTO: 0.1 %
KETONES URINE: NEGATIVE MG/DL
LDLC SERPL-MCNC: 142 MG/DL
LEUKOCYTE ESTERASE URINE: ABNORMAL
LYMPHOCYTES # BLD AUTO: 1.81 K/UL
LYMPHOCYTES NFR BLD AUTO: 27 %
MAN DIFF?: NORMAL
MCHC RBC-ENTMCNC: 32.7 PG
MCHC RBC-ENTMCNC: 35.3 G/DL
MCV RBC AUTO: 92.6 FL
MICROSCOPIC-UA: NORMAL
MONOCYTES # BLD AUTO: 0.43 K/UL
MONOCYTES NFR BLD AUTO: 6.4 %
NEUTROPHILS # BLD AUTO: 4.29 K/UL
NEUTROPHILS NFR BLD AUTO: 64 %
NITRITE URINE: NEGATIVE
NONHDLC SERPL-MCNC: 155 MG/DL
PH URINE: 6
PLATELET # BLD AUTO: 319 K/UL
POTASSIUM SERPL-SCNC: 4.6 MMOL/L
PROT SERPL-MCNC: 7 G/DL
PROTEIN URINE: NEGATIVE MG/DL
RBC # BLD: 4.07 M/UL
RBC # FLD: 12.8 %
RED BLOOD CELLS URINE: 0 /HPF
SODIUM SERPL-SCNC: 138 MMOL/L
SPECIFIC GRAVITY URINE: 1
T3RU NFR SERPL: 1.1 TBI
T4 FREE SERPL-MCNC: 1.2 NG/DL
TRIGL SERPL-MCNC: 77 MG/DL
TSH SERPL-ACNC: 1.96 UIU/ML
TSH SERPL-ACNC: 2.08 UIU/ML
UROBILINOGEN URINE: 0.2 MG/DL
VIT B12 SERPL-MCNC: 752 PG/ML
WBC # FLD AUTO: 6.71 K/UL
WHITE BLOOD CELLS URINE: 1 /HPF

## 2025-04-07 PROCEDURE — 99204 OFFICE O/P NEW MOD 45 MIN: CPT | Mod: 25

## 2025-04-07 PROCEDURE — 95004 PERQ TESTS W/ALRGNC XTRCS: CPT

## 2025-04-07 RX ORDER — FAMOTIDINE 20 MG/1
20 TABLET, FILM COATED ORAL
Qty: 60 | Refills: 5 | Status: ACTIVE | COMMUNITY
Start: 2025-04-07 | End: 1900-01-01

## 2025-08-29 ENCOUNTER — TRANSCRIPTION ENCOUNTER (OUTPATIENT)
Age: 55
End: 2025-08-29

## 2025-08-29 DIAGNOSIS — I10 ESSENTIAL (PRIMARY) HYPERTENSION: ICD-10-CM

## 2025-08-29 DIAGNOSIS — E03.9 HYPOTHYROIDISM, UNSPECIFIED: ICD-10-CM

## 2025-09-02 ENCOUNTER — LABORATORY RESULT (OUTPATIENT)
Age: 55
End: 2025-09-02

## 2025-09-03 LAB
ALBUMIN SERPL ELPH-MCNC: 4.5 G/DL
ALBUMIN, RANDOM URINE: <1.2 MG/DL
ALP BLD-CCNC: 55 U/L
ALT SERPL-CCNC: 15 U/L
ANION GAP SERPL CALC-SCNC: 15 MMOL/L
AST SERPL-CCNC: 18 U/L
BILIRUB SERPL-MCNC: 0.6 MG/DL
BUN SERPL-MCNC: 13 MG/DL
CALCIUM SERPL-MCNC: 9.6 MG/DL
CHLORIDE SERPL-SCNC: 105 MMOL/L
CHOLEST SERPL-MCNC: 183 MG/DL
CO2 SERPL-SCNC: 20 MMOL/L
CREAT SERPL-MCNC: 0.9 MG/DL
CREAT SPEC-SCNC: 221 MG/DL
EGFRCR SERPLBLD CKD-EPI 2021: 76 ML/MIN/1.73M2
ESTIMATED AVERAGE GLUCOSE: 108 MG/DL
GLUCOSE SERPL-MCNC: 91 MG/DL
HBA1C MFR BLD HPLC: 5.4 %
HDLC SERPL-MCNC: 52 MG/DL
LDLC SERPL-MCNC: 120 MG/DL
MICROALBUMIN/CREAT 24H UR-RTO: NORMAL MG/G
NONHDLC SERPL-MCNC: 131 MG/DL
POTASSIUM SERPL-SCNC: 4.5 MMOL/L
PROT SERPL-MCNC: 6.5 G/DL
SODIUM SERPL-SCNC: 140 MMOL/L
T3RU NFR SERPL: 0.9 TBI
T4 FREE SERPL-MCNC: 1.3 NG/DL
TRIGL SERPL-MCNC: 55 MG/DL
TSH SERPL-ACNC: 2.51 UIU/ML

## 2025-09-09 ENCOUNTER — TRANSCRIPTION ENCOUNTER (OUTPATIENT)
Age: 55
End: 2025-09-09